# Patient Record
Sex: MALE | Race: BLACK OR AFRICAN AMERICAN | Employment: FULL TIME | URBAN - METROPOLITAN AREA
[De-identification: names, ages, dates, MRNs, and addresses within clinical notes are randomized per-mention and may not be internally consistent; named-entity substitution may affect disease eponyms.]

---

## 2024-04-30 ENCOUNTER — HOSPITAL ENCOUNTER (EMERGENCY)
Age: 29
Discharge: HOME | End: 2024-04-30
Payer: COMMERCIAL

## 2024-04-30 VITALS
TEMPERATURE: 97.9 F | RESPIRATION RATE: 18 BRPM | OXYGEN SATURATION: 99 % | HEART RATE: 64 BPM | DIASTOLIC BLOOD PRESSURE: 61 MMHG | HEIGHT: 67 IN | SYSTOLIC BLOOD PRESSURE: 127 MMHG | WEIGHT: 152 LBS | BODY MASS INDEX: 23.86 KG/M2

## 2024-04-30 DIAGNOSIS — J06.9 VIRAL URI: Primary | ICD-10-CM

## 2024-04-30 LAB
FLUAV RNA SPEC QL NAA+PROBE: NOT DETECTED
FLUBV RNA SPEC QL NAA+PROBE: NOT DETECTED
SARS-COV-2 RDRP RESP QL NAA+PROBE: NOT DETECTED
SOURCE: NORMAL

## 2024-04-30 PROCEDURE — 99283 EMERGENCY DEPT VISIT LOW MDM: CPT

## 2024-04-30 PROCEDURE — 87502 INFLUENZA DNA AMP PROBE: CPT

## 2024-04-30 PROCEDURE — 87635 SARS-COV-2 COVID-19 AMP PRB: CPT

## 2024-04-30 ASSESSMENT — LIFESTYLE VARIABLES
HOW OFTEN DO YOU HAVE A DRINK CONTAINING ALCOHOL: NEVER
HOW MANY STANDARD DRINKS CONTAINING ALCOHOL DO YOU HAVE ON A TYPICAL DAY: PATIENT DOES NOT DRINK

## 2024-04-30 ASSESSMENT — PAIN SCALES - GENERAL: PAINLEVEL_OUTOF10: 2

## 2024-04-30 ASSESSMENT — PAIN DESCRIPTION - DESCRIPTORS: DESCRIPTORS: ACHING

## 2024-04-30 ASSESSMENT — PAIN DESCRIPTION - LOCATION: LOCATION: HEAD

## 2024-04-30 ASSESSMENT — PAIN - FUNCTIONAL ASSESSMENT: PAIN_FUNCTIONAL_ASSESSMENT: 0-10

## 2024-04-30 NOTE — ED PROVIDER NOTES
Emergency Department Provider Note       PCP: No primary care provider on file.   Age: 29 y.o.   Sex: male     DISPOSITION Decision To Discharge 04/30/2024 11:55:35 AM       ICD-10-CM    1. Viral URI  J06.9           Medical Decision Making     29-year-old male presents to the emergency department today for evaluation of headache, nasal congestion and sinus pressure and body aches.  Vital signs are stable.  Positive exposure to influenza last week.  He has not had any fevers.  Patient reports that his supervisor wanted him to be evaluated and needs a work note.  His COVID and flu test are both negative today.  Lungs are clear to auscultation.  He does have a very mild middle ear effusion behind the right TM however fluid is clear and there is no erythema or bulging to suggest otitis media.  Suspect eustachian tube dysfunction.  Treat with Mucinex and Flonase, supportive measures with Tylenol and Motrin and increase fluids.  Return precautions discussed.  Patient was provided with a work note.  He is in agreement with the discharge plan.  ED Course as of 04/30/24 1159   Tue Apr 30, 2024   1149 Influenza B, PRAFUL: Not detected [KE]   1149 Influenza A, PRAFUL: Not detected [KE]   1149 SARS-CoV-2, Rapid: Not detected [KE]      ED Course User Index  [KE] Abby Esteves PA     1 acute, uncomplicated illness or injury.  Shared medical decision making was utilized in creating the patients health plan today.    I independently ordered and reviewed each unique test.                     History     29-year-old male presenting to the emergency department today for evaluation of headache, nasal congestion and sinus pressure, body aches.  Symptoms began 2 days ago.  States daughter tested positive for influenza last week.  He has not had any fevers or chills.  He states that he was not feeling well at work today so they sent him home and told him to get a work note for today.    The history is provided by the patient. No language

## 2024-04-30 NOTE — DISCHARGE INSTRUCTIONS
Your covid and flu test were both negative today, as discussed  I believe your symptoms are likely due to viral upper respiratory illness.     Use OTC mucinex and flonase nasal spray to help with congestion/sinus pressure.     Alternate tylenol and motrin as needed for fever or body aches. You can take tylenol every 4 hours as needed. You can take ibuprofen every 6 hours as needed.       Follow-up with your PCP in 1 to 2 days if no improvement.  Return to the ER for any new or worsening symptoms.

## 2024-04-30 NOTE — ED TRIAGE NOTES
Patient ambulatory to ED w/ cc of headache, nasal congestion, dry cough, chills that started x 2 days ago. Pt denies NVD, shortness of breath, chest pain. Patient states that his daughter was diagnosed last Monday with the flu and patient was exposed. Patient took tylenol w/ no relief. Pt A&O x 4

## 2024-04-30 NOTE — ED NOTES
Patient mobility status  with no difficulty. Provider aware     I have reviewed discharge instructions with the patient.  The patient verbalized understanding.    Patient left ED via Discharge Method: ambulatory to Home with  self .    Opportunity for questions and clarification provided.     Patient given 0 scripts.           Jody Archuleta LPN  04/30/24 1217

## 2024-04-30 NOTE — ED NOTES
Patient walked into ER today with headache 4/10, nasal congestion, dry cough, chills that started x 2 days ago. Patient denies any vomiting, diarrhea, sob and cp. Patient states that his daughter was diagnosed last Monday with the flu and patient was exposed. Patient took tylenol and it did not relieve his symptoms.      Jody Archuleta LPN  04/30/24 1044

## 2024-12-02 ENCOUNTER — HOSPITAL ENCOUNTER (EMERGENCY)
Age: 29
Discharge: HOME | End: 2024-12-02
Attending: EMERGENCY MEDICINE
Payer: COMMERCIAL

## 2024-12-02 VITALS
TEMPERATURE: 98.2 F | OXYGEN SATURATION: 94 % | BODY MASS INDEX: 26.21 KG/M2 | DIASTOLIC BLOOD PRESSURE: 62 MMHG | HEIGHT: 67 IN | HEART RATE: 79 BPM | WEIGHT: 167 LBS | RESPIRATION RATE: 18 BRPM | SYSTOLIC BLOOD PRESSURE: 113 MMHG

## 2024-12-02 DIAGNOSIS — S39.012A STRAIN OF LUMBAR REGION, INITIAL ENCOUNTER: Primary | ICD-10-CM

## 2024-12-02 PROCEDURE — 96375 TX/PRO/DX INJ NEW DRUG ADDON: CPT

## 2024-12-02 PROCEDURE — 6360000002 HC RX W HCPCS: Performed by: EMERGENCY MEDICINE

## 2024-12-02 PROCEDURE — 99284 EMERGENCY DEPT VISIT MOD MDM: CPT

## 2024-12-02 PROCEDURE — 2580000003 HC RX 258: Performed by: EMERGENCY MEDICINE

## 2024-12-02 PROCEDURE — 96374 THER/PROPH/DIAG INJ IV PUSH: CPT

## 2024-12-02 RX ORDER — LIDOCAINE 50 MG/G
1 PATCH TOPICAL DAILY
Qty: 10 PATCH | Refills: 0 | Status: SHIPPED | OUTPATIENT
Start: 2024-12-02 | End: 2024-12-12

## 2024-12-02 RX ORDER — KETOROLAC TROMETHAMINE 30 MG/ML
30 INJECTION, SOLUTION INTRAMUSCULAR; INTRAVENOUS ONCE
Status: COMPLETED | OUTPATIENT
Start: 2024-12-02 | End: 2024-12-02

## 2024-12-02 RX ORDER — MORPHINE SULFATE 4 MG/ML
4 INJECTION INTRAVENOUS ONCE
Status: COMPLETED | OUTPATIENT
Start: 2024-12-02 | End: 2024-12-02

## 2024-12-02 RX ORDER — PREDNISONE 20 MG/1
20 TABLET ORAL 2 TIMES DAILY
Qty: 6 TABLET | Refills: 0 | Status: SHIPPED | OUTPATIENT
Start: 2024-12-02 | End: 2024-12-05

## 2024-12-02 RX ORDER — CYCLOBENZAPRINE HCL 10 MG
10 TABLET ORAL 3 TIMES DAILY PRN
Qty: 12 TABLET | Refills: 0 | Status: SHIPPED | OUTPATIENT
Start: 2024-12-02 | End: 2024-12-12

## 2024-12-02 RX ADMIN — MORPHINE SULFATE 4 MG: 4 INJECTION, SOLUTION INTRAMUSCULAR; INTRAVENOUS at 08:32

## 2024-12-02 RX ADMIN — LORAZEPAM 1 MG: 2 INJECTION INTRAMUSCULAR; INTRAVENOUS at 08:31

## 2024-12-02 RX ADMIN — KETOROLAC TROMETHAMINE 30 MG: 30 INJECTION, SOLUTION INTRAMUSCULAR at 08:32

## 2024-12-02 ASSESSMENT — PAIN DESCRIPTION - ORIENTATION: ORIENTATION: LOWER

## 2024-12-02 ASSESSMENT — PAIN SCALES - GENERAL
PAINLEVEL_OUTOF10: 10
PAINLEVEL_OUTOF10: 2
PAINLEVEL_OUTOF10: 10

## 2024-12-02 ASSESSMENT — PAIN DESCRIPTION - LOCATION
LOCATION: BACK
LOCATION: BACK

## 2024-12-02 ASSESSMENT — LIFESTYLE VARIABLES
HOW MANY STANDARD DRINKS CONTAINING ALCOHOL DO YOU HAVE ON A TYPICAL DAY: 1 OR 2
HOW OFTEN DO YOU HAVE A DRINK CONTAINING ALCOHOL: MONTHLY OR LESS

## 2024-12-02 ASSESSMENT — PAIN - FUNCTIONAL ASSESSMENT: PAIN_FUNCTIONAL_ASSESSMENT: 0-10

## 2024-12-02 ASSESSMENT — PAIN DESCRIPTION - PAIN TYPE: TYPE: ACUTE PAIN

## 2024-12-02 NOTE — ED PROVIDER NOTES
Emergency Department Provider Note       PCP: No primary care provider on file.   Age: 29 y.o.   Sex: male     DISPOSITION Decision To Discharge 12/02/2024 09:31:53 AM    ICD-10-CM    1. Strain of lumbar region, initial encounter  S39.012A           Medical Decision Making     8:10 AM EST  On initial exam, the patient is difficult to examine given his degree of pain.  However he seems to have localized tenderness predominantly over the left lumbar paraspinous musculature.  However I think it is important that we get his pain better controlled to obtain a more thorough evaluation.  He has no findings at this time that are concerning for acute cord compression or cauda equina    9:33 AM EST  Patient is feeling significantly proved after ministration medication.  He is been able to ambulate through the department.  I do not think that x-ray imaging is indicated given his physical examination, in addition I do not think advanced imaging is indicated as well such as CT or MRI.  Will treat symptomatically with conservative anti-inflammatory treatment.  Return precautions given.  They are comfortable's plan understand reasons to return.     1 acute, uncomplicated illness or injury.  Prescription drug management performed.  Shared medical decision making was utilized in creating the patients health plan today.  Considerations: The following items were considered but not ordered: Imaging such as x-ray and MRI.  I independently ordered and reviewed each unique test.                         History     29-year-old male presented to the emergency department complaining of back pain.  Patient states that his back pain began yesterday late in the evening/early this morning.  Patient states that yesterday he went to the gym, did some lifting but it was mostly benchpress, no lifting with his back.  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  He then went to work at second shift

## 2024-12-02 NOTE — ED NOTES
Pt reports feeling better after medication. Pt able to sit up, stand, and walk down iglesias. Pt reports pain improved. Gait steady with occasional limp in left leg. Pt reports feels some weakness in leg. Pt able to ambulate back to bed. MD notified will continue to monitor.

## 2024-12-02 NOTE — ED TRIAGE NOTES
Pt states he works out 3-4 times a week. He worked out normally yesterday but then later at work his back started hurting. Left work early because of the back pain. Now the pain is so bad he can't do anything. Everything hurts his back; sitting, standing, walking, etc. Denies injury to back.

## 2024-12-02 NOTE — DISCHARGE INSTRUCTIONS
As we discussed, your evaluation today appears consistent with an acute strain of the muscle in your back.  Apply the pain patch to the area of pain, use muscle relaxers as needed for discomfort particularly at night prior to sleeping.  Ice your back for 20 minutes at a time, 4 times a day.  Take the steroid as just prescribed to help decrease inflammation.  Return to the ER for incontinence of urine or stool, weakness in lower extremities or other concerns.